# Patient Record
Sex: FEMALE | Race: WHITE | Employment: FULL TIME | URBAN - METROPOLITAN AREA
[De-identification: names, ages, dates, MRNs, and addresses within clinical notes are randomized per-mention and may not be internally consistent; named-entity substitution may affect disease eponyms.]

---

## 2019-09-10 ENCOUNTER — OFFICE VISIT (OUTPATIENT)
Dept: FAMILY MEDICINE CLINIC | Facility: CLINIC | Age: 39
End: 2019-09-10

## 2019-09-10 VITALS
RESPIRATION RATE: 16 BRPM | TEMPERATURE: 98.1 F | OXYGEN SATURATION: 99 % | BODY MASS INDEX: 29.99 KG/M2 | HEIGHT: 65 IN | WEIGHT: 180 LBS | SYSTOLIC BLOOD PRESSURE: 110 MMHG | DIASTOLIC BLOOD PRESSURE: 78 MMHG | HEART RATE: 78 BPM

## 2019-09-10 DIAGNOSIS — F33.2 SEVERE EPISODE OF RECURRENT MAJOR DEPRESSIVE DISORDER, WITHOUT PSYCHOTIC FEATURES (HCC): Primary | ICD-10-CM

## 2019-09-10 DIAGNOSIS — E55.9 VITAMIN D DEFICIENCY: ICD-10-CM

## 2019-09-10 DIAGNOSIS — R06.83 SNORING: ICD-10-CM

## 2019-09-10 DIAGNOSIS — E61.1 LOW IRON: ICD-10-CM

## 2019-09-10 DIAGNOSIS — R79.0 LOW IRON STORES: ICD-10-CM

## 2019-09-10 DIAGNOSIS — Z00.00 LABORATORY EXAMINATION ORDERED AS PART OF A ROUTINE GENERAL MEDICAL EXAMINATION: ICD-10-CM

## 2019-09-10 DIAGNOSIS — R21 RASH AND NONSPECIFIC SKIN ERUPTION: ICD-10-CM

## 2019-09-10 DIAGNOSIS — F41.1 GENERALIZED ANXIETY DISORDER: ICD-10-CM

## 2019-09-10 PROBLEM — F32.A DEPRESSION: Status: ACTIVE | Noted: 2019-09-10

## 2019-09-10 PROCEDURE — 99203 OFFICE O/P NEW LOW 30 MIN: CPT | Performed by: PHYSICIAN ASSISTANT

## 2019-09-10 PROCEDURE — 90471 IMMUNIZATION ADMIN: CPT | Performed by: PHYSICIAN ASSISTANT

## 2019-09-10 PROCEDURE — 90472 IMMUNIZATION ADMIN EACH ADD: CPT | Performed by: PHYSICIAN ASSISTANT

## 2019-09-10 PROCEDURE — 90732 PPSV23 VACC 2 YRS+ SUBQ/IM: CPT | Performed by: PHYSICIAN ASSISTANT

## 2019-09-10 PROCEDURE — 90715 TDAP VACCINE 7 YRS/> IM: CPT | Performed by: PHYSICIAN ASSISTANT

## 2019-09-10 RX ORDER — METHYLPREDNISOLONE 4 MG/1
TABLET ORAL
Qty: 21 TABLET | Refills: 0 | Status: SHIPPED | OUTPATIENT
Start: 2019-09-10

## 2019-09-10 RX ORDER — SERTRALINE HYDROCHLORIDE 25 MG/1
TABLET, FILM COATED ORAL
Qty: 30 TABLET | Refills: 1 | Status: SHIPPED | OUTPATIENT
Start: 2019-09-10

## 2019-09-10 RX ORDER — ONDANSETRON 4 MG/1
4 TABLET, FILM COATED ORAL EVERY 8 HOURS PRN
Qty: 30 TABLET | Refills: 1 | Status: SHIPPED | OUTPATIENT
Start: 2019-09-10

## 2019-09-10 NOTE — PROGRESS NOTES
Subjective   Anushka ALVARENGA is a 38 y.o. female.     History of Present Illness   Anushka ALVARENGA 38 y.o. female who presents today for a new patient appointment.    she has a history of   Patient Active Problem List   Diagnosis   • Depression   .  she is here to establish care I reviewed the PFSH recorded today by my MA/LPN staff.   she   She has been feeling tired.  PHQ-9 Depression Screening  Little interest or pleasure in doing things? 2   Feeling down, depressed, or hopeless? 3   Trouble falling or staying asleep, or sleeping too much? 3   Feeling tired or having little energy? 3   Poor appetite or overeating? 3   Feeling bad about yourself - or that you are a failure or have let yourself or your family down? 3   Trouble concentrating on things, such as reading the newspaper or watching television? 3   Moving or speaking so slowly that other people could have noticed? Or the opposite - being so fidgety or restless that you have been moving around a lot more than usual? 3   Thoughts that you would be better off dead, or of hurting yourself in some way? 3   PHQ-9 Total Score 26   If you checked off any problems, how difficult have these problems made it for you to do your work, take care of things at home, or get along with other people? Very difficult     Sees GYN  Rash since yesterday on abd; not worse  Tired for mos and does snore---consider sleep study  Hx low iron  Anushka ALVARENGA 38 y.o. female presents for evaluation of a rash involving the chest, foot, trunk, upper body and upper extremity. Rash has been present for: 1 day. Lesions are pink, and are described as papular. Rash has not changed over time. Rash is pruritic. Associated symptoms  .none.  Patient denies:fever..  Treatment to date includes: Benadryl oral form with improvement. Symptoms are unchanged. Patient has not had contacts with similar rash. Patient has had new exposures (soaps, lotions, laundry detergents, foods, medications, plants, insects or  animals).      Has tried Prozac and Lexapro---felt worse; has Valium and I will not do---PRN only    Mom had breast cancer in her 40s    Anushka Chinese female 38 y.o. who presents today for new to me Depression and Anxiety.  She reports depressed mood, fatigue, anxiety, anhedonia, impaired concentration and irritable. See PHQ9 Onset of symptoms was approximately several years ago.  She denies current suicidal and homicidal ideation. Risk factors are family history of anxiety and or depression and lifestyle of multiple roles.  Previous treatment includes Lexapro, Prozac, Wellbutrin; has taken Zoloft and was long time ago.  She complains of the following medication side effects: anxiety.  The patient has previously been in counseling..  No plan to harm herself; 18 and 19 y/o sons and grandchild; ER if this changes    The following portions of the patient's history were reviewed and updated as appropriate: allergies, current medications, past family history, past medical history, past social history, past surgical history and problem list.    Review of Systems   Constitutional: Positive for fatigue. Negative for activity change, appetite change and unexpected weight change.   HENT: Negative for nosebleeds and trouble swallowing.    Eyes: Negative for pain and visual disturbance.   Respiratory: Positive for shortness of breath. Negative for chest tightness and wheezing.    Cardiovascular: Positive for chest pain. Negative for palpitations.   Gastrointestinal: Negative for abdominal pain and blood in stool.   Endocrine: Negative.    Genitourinary: Negative for difficulty urinating and hematuria.   Musculoskeletal: Negative for joint swelling.   Skin: Positive for rash. Negative for color change.   Allergic/Immunologic: Negative.    Neurological: Negative for syncope and speech difficulty.   Hematological: Negative for adenopathy. Bruises/bleeds easily.   Psychiatric/Behavioral: Positive for agitation, decreased  concentration, dysphoric mood, sleep disturbance and suicidal ideas. Negative for confusion. The patient is nervous/anxious.    All other systems reviewed and are negative.      Objective   Physical Exam   Constitutional: She is oriented to person, place, and time. She appears well-developed and well-nourished. No distress.   HENT:   Head: Normocephalic and atraumatic.   Right Ear: External ear normal.   Left Ear: External ear normal.   Nose: Nose normal.   Mouth/Throat: Oropharynx is clear and moist. No oropharyngeal exudate.   Ear fluid   Eyes: Conjunctivae and EOM are normal. Pupils are equal, round, and reactive to light. No scleral icterus.   Neck: Normal range of motion. Neck supple. No thyromegaly present.   Cardiovascular: Normal rate, regular rhythm, normal heart sounds and intact distal pulses.   No murmur heard.  Pulmonary/Chest: Effort normal and breath sounds normal. No respiratory distress. She has no wheezes. She has no rales.   Abdominal: Soft.   Musculoskeletal: Normal range of motion. She exhibits no deformity.   Lymphadenopathy:     She has no cervical adenopathy.   Neurological: She is alert and oriented to person, place, and time. Coordination normal.   Skin: Skin is warm and dry. Rash noted.   Tiny pink papules and few pustular; not on legs, not on scalp or face; ---trunk, back, abd; one spot on arm   Psychiatric: She has a normal mood and affect. Her behavior is normal. Judgment and thought content normal.   Nursing note and vitals reviewed.      Assessment/Plan   Problems Addressed this Visit        Other    Depression    Relevant Orders    Comprehensive metabolic panel    Lipid panel    CBC and Differential    TSH    T3, Free    T4, Free    Vitamin B12    Folate    Vitamin D 25 Hydroxy    Iron Profile    Ferritin      Other Visit Diagnoses     Snoring    -  Primary    Relevant Orders    Ambulatory Referral to Sleep Medicine    Comprehensive metabolic panel    Lipid panel    CBC and  Differential    TSH    T3, Free    T4, Free    Vitamin B12    Folate    Vitamin D 25 Hydroxy    Iron Profile    Ferritin    Generalized anxiety disorder        Relevant Orders    Comprehensive metabolic panel    Lipid panel    CBC and Differential    TSH    T3, Free    T4, Free    Vitamin B12    Folate    Vitamin D 25 Hydroxy    Iron Profile    Ferritin    Low iron        Relevant Orders    Comprehensive metabolic panel    Lipid panel    CBC and Differential    TSH    T3, Free    T4, Free    Vitamin B12    Folate    Vitamin D 25 Hydroxy    Iron Profile    Ferritin    Laboratory examination ordered as part of a routine general medical examination        Relevant Orders    Comprehensive metabolic panel    Lipid panel    CBC and Differential    TSH    T3, Free    T4, Free    Vitamin B12    Folate    Vitamin D 25 Hydroxy    Iron Profile    Ferritin      start low on Zoloft and slow titrate  Labs and check iron d/t hx low  Stop smoking  Sleep study--snores and tired  vaccines  Medrol for rash;

## 2019-09-10 NOTE — PATIENT INSTRUCTIONS
You can get OTC Domeboro to mix with water and soak in this or make cool compresses for itching as well.  Avoid heat, even in the shower.  This tends to make the itching worse.  Contact office if your rash becomes infected, like red streaks around it or pustular drainage, and/or fever.  Contact office if rash persists beyond this 2 to 2 1/2 weeks time.

## 2019-09-11 PROBLEM — E55.9 VITAMIN D DEFICIENCY: Status: ACTIVE | Noted: 2019-09-11

## 2019-09-11 PROBLEM — R79.0 LOW IRON STORES: Status: ACTIVE | Noted: 2019-09-11

## 2019-09-11 LAB
25(OH)D3+25(OH)D2 SERPL-MCNC: 25.3 NG/ML (ref 30–100)
ALBUMIN SERPL-MCNC: 5 G/DL (ref 3.5–5.2)
ALBUMIN/GLOB SERPL: 1.9 G/DL
ALP SERPL-CCNC: 42 U/L (ref 39–117)
ALT SERPL-CCNC: 18 U/L (ref 1–33)
AST SERPL-CCNC: 16 U/L (ref 1–32)
BASOPHILS # BLD AUTO: 0.04 10*3/MM3 (ref 0–0.2)
BASOPHILS NFR BLD AUTO: 0.8 % (ref 0–1.5)
BILIRUB SERPL-MCNC: 0.2 MG/DL (ref 0.2–1.2)
BUN SERPL-MCNC: 8 MG/DL (ref 6–20)
BUN/CREAT SERPL: 10.8 (ref 7–25)
CALCIUM SERPL-MCNC: 9.3 MG/DL (ref 8.6–10.5)
CHLORIDE SERPL-SCNC: 103 MMOL/L (ref 98–107)
CHOLEST SERPL-MCNC: 191 MG/DL (ref 0–200)
CO2 SERPL-SCNC: 24.7 MMOL/L (ref 22–29)
CREAT SERPL-MCNC: 0.74 MG/DL (ref 0.57–1)
EOSINOPHIL # BLD AUTO: 0.06 10*3/MM3 (ref 0–0.4)
EOSINOPHIL NFR BLD AUTO: 1.3 % (ref 0.3–6.2)
ERYTHROCYTE [DISTWIDTH] IN BLOOD BY AUTOMATED COUNT: 14.6 % (ref 12.3–15.4)
FERRITIN SERPL-MCNC: 25.3 NG/ML (ref 13–150)
FOLATE SERPL-MCNC: 12.2 NG/ML (ref 4.78–24.2)
GLOBULIN SER CALC-MCNC: 2.6 GM/DL
GLUCOSE SERPL-MCNC: 79 MG/DL (ref 65–99)
HCT VFR BLD AUTO: 45.7 % (ref 34–46.6)
HDLC SERPL-MCNC: 46 MG/DL (ref 40–60)
HGB BLD-MCNC: 14.4 G/DL (ref 12–15.9)
IMM GRANULOCYTES # BLD AUTO: 0.02 10*3/MM3 (ref 0–0.05)
IMM GRANULOCYTES NFR BLD AUTO: 0.4 % (ref 0–0.5)
IRON SATN MFR SERPL: 11 % (ref 20–50)
IRON SERPL-MCNC: 57 MCG/DL (ref 37–145)
LDLC SERPL CALC-MCNC: 123 MG/DL (ref 0–100)
LYMPHOCYTES # BLD AUTO: 1.23 10*3/MM3 (ref 0.7–3.1)
LYMPHOCYTES NFR BLD AUTO: 25.8 % (ref 19.6–45.3)
MCH RBC QN AUTO: 31.2 PG (ref 26.6–33)
MCHC RBC AUTO-ENTMCNC: 31.5 G/DL (ref 31.5–35.7)
MCV RBC AUTO: 99.1 FL (ref 79–97)
MONOCYTES # BLD AUTO: 0.51 10*3/MM3 (ref 0.1–0.9)
MONOCYTES NFR BLD AUTO: 10.7 % (ref 5–12)
NEUTROPHILS # BLD AUTO: 2.9 10*3/MM3 (ref 1.7–7)
NEUTROPHILS NFR BLD AUTO: 61 % (ref 42.7–76)
NRBC BLD AUTO-RTO: 0 /100 WBC (ref 0–0.2)
PLATELET # BLD AUTO: 336 10*3/MM3 (ref 140–450)
POTASSIUM SERPL-SCNC: 5 MMOL/L (ref 3.5–5.2)
PROT SERPL-MCNC: 7.6 G/DL (ref 6–8.5)
RBC # BLD AUTO: 4.61 10*6/MM3 (ref 3.77–5.28)
SODIUM SERPL-SCNC: 141 MMOL/L (ref 136–145)
T3FREE SERPL-MCNC: 3.4 PG/ML (ref 2–4.4)
T4 FREE SERPL-MCNC: 1.22 NG/DL (ref 0.93–1.7)
TIBC SERPL-MCNC: 496 MCG/DL
TRIGL SERPL-MCNC: 108 MG/DL (ref 0–150)
TSH SERPL DL<=0.005 MIU/L-ACNC: 3.89 UIU/ML (ref 0.27–4.2)
UIBC SERPL-MCNC: 439 MCG/DL (ref 112–346)
VIT B12 SERPL-MCNC: 556 PG/ML (ref 211–946)
VLDLC SERPL CALC-MCNC: 21.6 MG/DL
WBC # BLD AUTO: 4.76 10*3/MM3 (ref 3.4–10.8)

## 2019-09-11 RX ORDER — FERROUS SULFATE 325(65) MG
TABLET ORAL
Qty: 30 TABLET | Refills: 11 | Status: SHIPPED | OUTPATIENT
Start: 2019-09-11

## 2019-12-04 ENCOUNTER — RESULTS ENCOUNTER (OUTPATIENT)
Dept: FAMILY MEDICINE CLINIC | Facility: CLINIC | Age: 39
End: 2019-12-04

## 2019-12-04 DIAGNOSIS — R79.0 LOW IRON STORES: ICD-10-CM

## 2019-12-04 DIAGNOSIS — E55.9 VITAMIN D DEFICIENCY: ICD-10-CM

## 2019-12-04 DIAGNOSIS — E61.1 LOW IRON: ICD-10-CM

## 2023-05-30 ENCOUNTER — OFFICE VISIT (OUTPATIENT)
Dept: FAMILY MEDICINE CLINIC | Facility: CLINIC | Age: 43
End: 2023-05-30

## 2023-05-30 VITALS
OXYGEN SATURATION: 96 % | DIASTOLIC BLOOD PRESSURE: 71 MMHG | BODY MASS INDEX: 27.72 KG/M2 | HEART RATE: 75 BPM | SYSTOLIC BLOOD PRESSURE: 110 MMHG | WEIGHT: 166.4 LBS | TEMPERATURE: 97.8 F | HEIGHT: 65 IN

## 2023-05-30 DIAGNOSIS — R29.818 SUSPECTED SLEEP APNEA: ICD-10-CM

## 2023-05-30 DIAGNOSIS — E22.1 HYPERPROLACTINEMIA: ICD-10-CM

## 2023-05-30 DIAGNOSIS — F41.1 GAD (GENERALIZED ANXIETY DISORDER): ICD-10-CM

## 2023-05-30 DIAGNOSIS — F33.2 SEVERE EPISODE OF RECURRENT MAJOR DEPRESSIVE DISORDER, WITHOUT PSYCHOTIC FEATURES: ICD-10-CM

## 2023-05-30 DIAGNOSIS — E55.9 VITAMIN D DEFICIENCY: ICD-10-CM

## 2023-05-30 DIAGNOSIS — Z76.89 ENCOUNTER TO ESTABLISH CARE: Primary | ICD-10-CM

## 2023-05-30 DIAGNOSIS — R51.9 GENERALIZED HEADACHES: ICD-10-CM

## 2023-05-30 DIAGNOSIS — F17.200 CURRENT SMOKER: ICD-10-CM

## 2023-05-30 DIAGNOSIS — R06.2 WHEEZING: ICD-10-CM

## 2023-05-30 PROCEDURE — 82306 VITAMIN D 25 HYDROXY: CPT | Performed by: NURSE PRACTITIONER

## 2023-05-30 PROCEDURE — 84146 ASSAY OF PROLACTIN: CPT | Performed by: NURSE PRACTITIONER

## 2023-05-30 RX ORDER — MULTIPLE VITAMINS W/ MINERALS TAB 9MG-400MCG
1 TAB ORAL DAILY
COMMUNITY

## 2023-05-30 NOTE — ASSESSMENT & PLAN NOTE
Prolactin 132.5  In February   Seen by Dr. Minesh TREADWELL  MRI brain pending from [facility]  First MRI reviewed from February 2023  Consider Neurology referral

## 2023-05-30 NOTE — ASSESSMENT & PLAN NOTE
Been going on for a long time. Has not been addressed. She has had witnessed episodes of her stopped breathing. She has woken herself up before with choking sensation.     Snap diagnostic sleep study ordered

## 2023-05-30 NOTE — ASSESSMENT & PLAN NOTE
Not currently on medication. She does not feel like it is controlled. She has been on medication before and reports the only ones that have worked were wellbutrin ER and lorazepam- then tried valium before. She does not like to take medication, but willing to try medication. She has had some traumatic events. Denies SI or HI. Seen by Psych in the past.     Encouraged counseling

## 2023-05-30 NOTE — PATIENT INSTRUCTIONS
Check labs   Sleep study ordered  Prescribed albuterol  Referral to Psych   PFT's ordered for hospital

## 2023-05-30 NOTE — ASSESSMENT & PLAN NOTE
Smokes 1 PPD. She is going to quit on her own.     Anushka ALVARENGA  reports that she has been smoking cigarettes. She has a 20.00 pack-year smoking history. She has never used smokeless tobacco.. I have educated her on the risk of diseases from using tobacco products such as cancer, COPD and heart disease.     I advised her to quit and she is willing to quit. We have discussed the following method/s for tobacco cessation:  She is going to quit on her own.     I spent 3  minutes counseling the patient.

## 2023-05-30 NOTE — ASSESSMENT & PLAN NOTE
Suspect COPD    Prescribed albuterol inhaler as needed    Encourage smoking cessation     PFT's ordered for hospital

## 2023-05-30 NOTE — PROGRESS NOTES
Chief Complaint  Chief Complaint   Patient presents with   • Establish Care     New patient   High prolactin levels, vitamin d deficient   • Asthma   • Depression   • Witnessed Apnea     Boyfriend has noticed her stop breathing before        Subjective          Anushka ALVARENGA is here today to establish care. The following problems were discussed:       Depression with anxiety- Not currently on medication. She does not feel like it is controlled. She has been on medication before and reports the only ones that have worked were wellbutrin ER and lorazepam- then tried valium before. She does not like to take medication, but willing to try medication. She has had some traumatic events. Denies SI or HI. She is taking Arpan's wart.     Vitamin D defiency-  She takes a multivitamin, not vitamin D supplement.     Tobacco abuse- Smokes 1 PPD. She is going to quit on her own.     Suspect sleep apnea- Been going on for a long time. Has not been addressed. She has had witnessed episodes of her stopped breathing. She has woken herself up before with choking sensation.     She is from this area, Previously lived [place]   Previous PCP: [facility]  Marital status-   Children- Yes  Works as Unemployed   Exercise- not at all  Diet- Eating well-balanced meals        Review of Systems   Constitutional: Negative for chills and fever.   HENT: Negative for congestion.    Respiratory: Negative for shortness of breath.    Cardiovascular: Negative for chest pain.   Gastrointestinal: Negative for abdominal pain, nausea and vomiting.   Genitourinary: Negative for difficulty urinating.   Musculoskeletal: Negative for arthralgias.   Skin: Negative.    Neurological: Positive for light-headedness. Negative for dizziness and headache.   Psychiatric/Behavioral: Positive for depressed mood. Negative for self-injury and suicidal ideas. The patient is nervous/anxious.         Objective   Vital Signs:   Vitals:    05/30/23 1132  "  BP: 110/71   Pulse: 75   Temp: 97.8 °F (36.6 °C)   SpO2: 96%      Estimated body mass index is 27.69 kg/m² as calculated from the following:    Height as of this encounter: 165.1 cm (65\").    Weight as of this encounter: 75.5 kg (166 lb 6.4 oz).            Physical Exam  Vitals reviewed.   Constitutional:       Appearance: Normal appearance.   HENT:      Head: Normocephalic and atraumatic.      Nose: Nose normal.      Mouth/Throat:      Mouth: Mucous membranes are moist.      Pharynx: Oropharynx is clear.   Eyes:      Extraocular Movements: Extraocular movements intact.      Conjunctiva/sclera: Conjunctivae normal.      Pupils: Pupils are equal, round, and reactive to light.   Cardiovascular:      Rate and Rhythm: Normal rate and regular rhythm.      Pulses: Normal pulses.      Heart sounds: Normal heart sounds.      Comments: S1, S2 audible  Pulmonary:      Effort: Pulmonary effort is normal.      Breath sounds: Normal breath sounds.      Comments: On room air   Abdominal:      General: Abdomen is flat.   Musculoskeletal:         General: Normal range of motion.      Cervical back: Normal range of motion.   Skin:     General: Skin is warm and dry.   Neurological:      General: No focal deficit present.      Mental Status: She is alert and oriented to person, place, and time. Mental status is at baseline.   Psychiatric:         Mood and Affect: Mood normal.         Behavior: Behavior normal.         Thought Content: Thought content normal.         Judgment: Judgment normal.                Physical Exam   Result Review :             Procedures       Assessment and Plan     Diagnoses and all orders for this visit:    1. Encounter to establish care (Primary)    2. Vitamin D deficiency  -     Vitamin D 25 hydroxy    3. Severe episode of recurrent major depressive disorder, without psychotic features  Assessment & Plan:  Not currently on medication. She does not feel like it is controlled. She has been on medication " before and reports the only ones that have worked were wellbutrin ER and lorazepam- then tried valium before. She does not like to take medication, but willing to try medication. She has had some traumatic events. Denies SI or HI.   Seen by Psych in the past. She is taking Arpan's wart.     Encouraged counseling    Orders:  -     Ambulatory Referral to Psychiatry    4. GENIA (generalized anxiety disorder)  Assessment & Plan:  Not currently on medication. She does not feel like it is controlled. She has been on medication before and reports the only ones that have worked were wellbutrin ER and lorazepam- then tried valium before. She does not like to take medication, but willing to try medication. She has had some traumatic events. Denies SI or HI. Seen by Psych in the past.     Encouraged counseling         Orders:  -     Ambulatory Referral to Psychiatry    5. Generalized headaches    6. Current smoker  Assessment & Plan:  Smokes 1 PPD. She is going to quit on her own.     Anushka ALVARENGA  reports that she has been smoking cigarettes. She has a 20.00 pack-year smoking history. She has never used smokeless tobacco.. I have educated her on the risk of diseases from using tobacco products such as cancer, COPD and heart disease.     I advised her to quit and she is willing to quit. We have discussed the following method/s for tobacco cessation:  She is going to quit on her own.     I spent 3  minutes counseling the patient.           7. Suspected sleep apnea  Assessment & Plan:  Been going on for a long time. Has not been addressed. She has had witnessed episodes of her stopped breathing. She has woken herself up before with choking sensation.     Snap diagnostic sleep study ordered    Orders:  -     Home Sleep Study; Future    8. Wheezing  Assessment & Plan:  Suspect COPD    Prescribed albuterol inhaler as needed    Encourage smoking cessation     PFT's ordered for hospital     Orders:  -     Complete PFT; Future    9.  Hyperprolactinemia  Assessment & Plan:  Prolactin 132.5  In February   Seen by Dr. Minesh TREADWELL  MRI brain pending from [facility]  First MRI reviewed from February 2023  Consider Neurology referral     Orders:  -     Prolactin            Follow Up   Return in about 6 months (around 11/30/2023) for Annual physical.   Patient was given instructions and counseling regarding her condition or for health maintenance advice. Please see specific information pulled into the AVS if appropriate.

## 2023-05-30 NOTE — ASSESSMENT & PLAN NOTE
Not currently on medication. She does not feel like it is controlled. She has been on medication before and reports the only ones that have worked were wellbutrin ER and lorazepam- then tried valium before. She does not like to take medication, but willing to try medication. She has had some traumatic events. Denies SI or HI.   Seen by Psych in the past. She is taking Arpan's wart.     Encouraged counseling

## 2023-05-30 NOTE — PROGRESS NOTES
Venipuncture performed in left arm by Karen Hickman MA with good hemostasis. Patient tolerated well. Karen Hickman MA 04/14/23

## 2023-05-31 ENCOUNTER — TELEPHONE (OUTPATIENT)
Dept: FAMILY MEDICINE CLINIC | Facility: CLINIC | Age: 43
End: 2023-05-31

## 2023-05-31 DIAGNOSIS — F41.1 GAD (GENERALIZED ANXIETY DISORDER): Primary | ICD-10-CM

## 2023-05-31 DIAGNOSIS — R41.3: Primary | ICD-10-CM

## 2023-05-31 LAB
25(OH)D3 SERPL-MCNC: 26.3 NG/ML (ref 30–100)
PROLACTIN SERPL-MCNC: 75 NG/ML (ref 4.79–23.3)

## 2023-05-31 NOTE — TELEPHONE ENCOUNTER
I called patient and went over the preliminary MRI of the brain.  She wants a neurology referral due to the concern of the abnormality from the first MRI that she had.  She explains she has had severe agitation, stress, lack of memory.  She is worried this could be neurological versus psychological.  Patient was also referred to [facility].  Patient requested referral to [facility].  Referral was placed.

## 2023-06-02 DIAGNOSIS — R29.818 SUSPECTED SLEEP APNEA: Primary | ICD-10-CM

## 2023-06-13 ENCOUNTER — TELEPHONE (OUTPATIENT)
Dept: FAMILY MEDICINE CLINIC | Facility: CLINIC | Age: 43
End: 2023-06-13
Payer: MEDICAID